# Patient Record
Sex: MALE | Race: WHITE | NOT HISPANIC OR LATINO | Employment: OTHER | URBAN - METROPOLITAN AREA
[De-identification: names, ages, dates, MRNs, and addresses within clinical notes are randomized per-mention and may not be internally consistent; named-entity substitution may affect disease eponyms.]

---

## 2019-02-18 ENCOUNTER — TELEPHONE (OUTPATIENT)
Dept: UROLOGY | Facility: MEDICAL CENTER | Age: 69
End: 2019-02-18

## 2019-02-18 NOTE — TELEPHONE ENCOUNTER
Reason for appointment/Complaint/Diagnosis : TARUN    Insurance: [address] [place] [address]  If yes, what kind? Not applicable    Previous urologist?     Over 20 years ago                Records requested/where? No    Outside testing/where? Not applicable     Location Preference for office visit?  [place] or first available

## 2020-09-29 ENCOUNTER — OFFICE VISIT (OUTPATIENT)
Dept: UROLOGY | Facility: HOSPITAL | Age: 70
End: 2020-09-29
Payer: COMMERCIAL

## 2020-09-29 VITALS
HEIGHT: 70 IN | SYSTOLIC BLOOD PRESSURE: 150 MMHG | HEART RATE: 79 BPM | BODY MASS INDEX: 30.64 KG/M2 | DIASTOLIC BLOOD PRESSURE: 82 MMHG | TEMPERATURE: 98.1 F | WEIGHT: 214 LBS

## 2020-09-29 DIAGNOSIS — N52.9 ERECTILE DYSFUNCTION, UNSPECIFIED ERECTILE DYSFUNCTION TYPE: ICD-10-CM

## 2020-09-29 DIAGNOSIS — R35.0 FREQUENCY OF URINATION: Primary | ICD-10-CM

## 2020-09-29 LAB
POST-VOID RESIDUAL VOLUME, ML POC: 0 ML
SL AMB  POCT GLUCOSE, UA: NORMAL
SL AMB LEUKOCYTE ESTERASE,UA: NORMAL
SL AMB POCT BILIRUBIN,UA: NORMAL
SL AMB POCT BLOOD,UA: NORMAL
SL AMB POCT CLARITY,UA: CLEAR
SL AMB POCT COLOR,UA: YELLOW
SL AMB POCT KETONES,UA: NORMAL
SL AMB POCT NITRITE,UA: NORMAL
SL AMB POCT PH,UA: 7
SL AMB POCT SPECIFIC GRAVITY,UA: 1
SL AMB POCT URINE PROTEIN: NORMAL
SL AMB POCT UROBILINOGEN: 0.2

## 2020-09-29 PROCEDURE — 81002 URINALYSIS NONAUTO W/O SCOPE: CPT | Performed by: UROLOGY

## 2020-09-29 PROCEDURE — 99244 OFF/OP CNSLTJ NEW/EST MOD 40: CPT | Performed by: UROLOGY

## 2020-09-29 PROCEDURE — 51798 US URINE CAPACITY MEASURE: CPT | Performed by: UROLOGY

## 2020-09-29 RX ORDER — TADALAFIL 5 MG/1
5 TABLET ORAL DAILY
Qty: 30 TABLET | Refills: 1 | Status: SHIPPED | OUTPATIENT
Start: 2020-09-29 | End: 2020-11-25 | Stop reason: SDUPTHER

## 2020-09-29 RX ORDER — CHLORAL HYDRATE 500 MG
1000 CAPSULE ORAL
COMMUNITY

## 2020-09-29 NOTE — PROGRESS NOTES
Assessment/Plan:    Frequency of urination  We discussed that his symptoms are likely secondary to an enlarging prostate  They are to get there records of labs and bring him to the next appointment  We discussed treatment options including medications and surgical procedures  We discussed daily Cialis as it would help with both his erectile dysfunction and his urinary symptoms  The patient wishes to try the medication  It was prescribed and side effects were discussed  He will follow up in 6-8 weeks to re-evaluate his symptoms and check a postvoid residual     Erectile dysfunction  Cialis prescribed as described above  Diagnoses and all orders for this visit:    Frequency of urination  -     POCT urine dip  -     POCT Measure PVR  -     tadalafil (CIALIS) 5 MG tablet; Take 1 tablet (5 mg total) by mouth daily    Erectile dysfunction, unspecified erectile dysfunction type  -     tadalafil (CIALIS) 5 MG tablet; Take 1 tablet (5 mg total) by mouth daily    Other orders  -     Omega-3 Fatty Acids (fish oil) 1,000 mg; Take 1,000 mg by mouth 4 (four) times a day (before meals and at bedtime)  -     CALCIUM-MAGNESIUM PO; Take by mouth  -     Probiotic Product (PROBIOTIC PO); Take by mouth          Total visit time was 60 minutes of which over 50% was spent on counseling  Subjective:     Patient ID: Tabby Mendez is a 72 y o  male    80-year-old male presents for evaluation of lower urinary tract symptoms  The patient states that since the new year he has been having worsening urinary symptoms  His symptoms are detailed below  His only medication is sildenafil as needed and testosterone replacement  He does get labs with the person that is managing his testosterone but he does not know what his PSA levels have been  He denies any family history of prostate cancer  He also has a distant history of a kidney stone which he passed on his own    He states that even with 100 mg of sildenafil he is having some issues with maintaining erections  He has no other complaints  The following portions of the patient's history were reviewed and updated as appropriate: allergies, current medications, past family history, past medical history, past social history, past surgical history and problem list     Review of Systems   Constitutional: Negative  HENT: Negative  Eyes: Negative  Respiratory: Negative  Cardiovascular: Negative  Gastrointestinal: Negative  Endocrine: Negative  Genitourinary:        As noted per HPI   Musculoskeletal: Negative  Skin: Negative  Allergic/Immunologic: Negative  Neurological: Negative  Hematological: Negative  Psychiatric/Behavioral: Negative  AUA SYMPTOM SCORE      Most Recent Value   AUA SYMPTOM SCORE   How often have you had a sensation of not emptying your bladder completely after you finished urinating? 5   How often have you had to urinate again less than two hours after you finished urinating? 5   How often have you found you stopped and started again several times when you urinate? 5   How often have you found it difficult to postpone urination? 3   How often have you had a weak urinary stream?  4   How often have you had to push or strain to begin urination? 4   How many times did you most typically get up to urinate from the time you went to bed at night until the time you got up in the morning? 2   Quality of Life: If you were to spend the rest of your life with your urinary condition just the way it is now, how would you feel about that?  6   AUA SYMPTOM SCORE  28              Objective:    Physical Exam  Vitals signs reviewed  Constitutional:       Appearance: He is well-developed  Neck:      Musculoskeletal: Normal range of motion  Pulmonary:      Effort: Pulmonary effort is normal    Abdominal:      General: Bowel sounds are normal  There is no distension  Palpations: Abdomen is soft  There is no mass  Tenderness: There is no abdominal tenderness  There is no guarding or rebound  Genitourinary:     Rectum: Normal       Comments: Unable to palpate the entire prostate but the apex was free of nodules  Musculoskeletal: Normal range of motion  Skin:     General: Skin is warm and dry  Neurological:      Mental Status: He is alert and oriented to person, place, and time             Results  No results found for: PSA  No results found for: GLUCOSE, CALCIUM, NA, K, CO2, CL, BUN, CREATININE  No results found for: WBC, HGB, HCT, MCV, PLT    Recent Results (from the past 1 hour(s))   POCT urine dip    Collection Time: 09/29/20 10:08 AM   Result Value Ref Range    LEUKOCYTE ESTERASE,UA -     NITRITE,UA -     SL AMB POCT UROBILINOGEN 0 2     POCT URINE PROTEIN -      PH,UA 7 0     BLOOD,UA -     SPECIFIC GRAVITY,UA 1 000     KETONES,UA -     BILIRUBIN,UA -     GLUCOSE, UA -      COLOR,UA yellow     CLARITY,UA clear    POCT Measure PVR    Collection Time: 09/29/20 10:08 AM   Result Value Ref Range    POST-VOID RESIDUAL VOLUME, ML POC 0 mL   ]

## 2020-09-29 NOTE — ASSESSMENT & PLAN NOTE
We discussed that his symptoms are likely secondary to an enlarging prostate  They are to get there records of labs and bring him to the next appointment  We discussed treatment options including medications and surgical procedures  We discussed daily Cialis as it would help with both his erectile dysfunction and his urinary symptoms  The patient wishes to try the medication  It was prescribed and side effects were discussed    He will follow up in 6-8 weeks to re-evaluate his symptoms and check a postvoid residual

## 2020-10-30 ENCOUNTER — TELEPHONE (OUTPATIENT)
Dept: UROLOGY | Facility: AMBULATORY SURGERY CENTER | Age: 70
End: 2020-10-30

## 2020-11-23 DIAGNOSIS — R35.0 FREQUENCY OF URINATION: ICD-10-CM

## 2020-11-23 DIAGNOSIS — N52.9 ERECTILE DYSFUNCTION, UNSPECIFIED ERECTILE DYSFUNCTION TYPE: ICD-10-CM

## 2020-11-25 ENCOUNTER — OFFICE VISIT (OUTPATIENT)
Dept: UROLOGY | Facility: HOSPITAL | Age: 70
End: 2020-11-25
Payer: COMMERCIAL

## 2020-11-25 VITALS
SYSTOLIC BLOOD PRESSURE: 160 MMHG | HEIGHT: 70 IN | DIASTOLIC BLOOD PRESSURE: 88 MMHG | BODY MASS INDEX: 30.64 KG/M2 | WEIGHT: 214 LBS | HEART RATE: 80 BPM

## 2020-11-25 DIAGNOSIS — R35.0 FREQUENCY OF URINATION: Primary | ICD-10-CM

## 2020-11-25 DIAGNOSIS — N52.9 ERECTILE DYSFUNCTION, UNSPECIFIED ERECTILE DYSFUNCTION TYPE: ICD-10-CM

## 2020-11-25 DIAGNOSIS — Z12.5 SCREENING FOR PROSTATE CANCER: ICD-10-CM

## 2020-11-25 LAB — POST-VOID RESIDUAL VOLUME, ML POC: 183 ML

## 2020-11-25 PROCEDURE — 99214 OFFICE O/P EST MOD 30 MIN: CPT | Performed by: NURSE PRACTITIONER

## 2020-11-25 PROCEDURE — 51798 US URINE CAPACITY MEASURE: CPT | Performed by: NURSE PRACTITIONER

## 2020-11-25 RX ORDER — TADALAFIL 5 MG/1
5 TABLET ORAL DAILY
Qty: 90 TABLET | Refills: 3 | Status: SHIPPED | OUTPATIENT
Start: 2020-11-25 | End: 2021-12-07 | Stop reason: SDUPTHER

## 2020-12-29 RX ORDER — TADALAFIL 5 MG/1
TABLET ORAL
Qty: 30 TABLET | Refills: 0 | OUTPATIENT
Start: 2020-12-29

## 2021-03-04 DIAGNOSIS — Z23 ENCOUNTER FOR IMMUNIZATION: ICD-10-CM

## 2021-03-07 ENCOUNTER — IMMUNIZATIONS (OUTPATIENT)
Dept: FAMILY MEDICINE CLINIC | Facility: HOSPITAL | Age: 71
End: 2021-03-07

## 2021-03-07 DIAGNOSIS — Z23 ENCOUNTER FOR IMMUNIZATION: Primary | ICD-10-CM

## 2021-03-07 PROCEDURE — 91300 SARS-COV-2 / COVID-19 MRNA VACCINE (PFIZER-BIONTECH) 30 MCG: CPT

## 2021-03-07 PROCEDURE — 0001A SARS-COV-2 / COVID-19 MRNA VACCINE (PFIZER-BIONTECH) 30 MCG: CPT

## 2021-05-14 ENCOUNTER — TELEPHONE (OUTPATIENT)
Dept: DERMATOLOGY | Facility: CLINIC | Age: 71
End: 2021-05-14

## 2021-10-09 ENCOUNTER — IMMUNIZATIONS (OUTPATIENT)
Dept: FAMILY MEDICINE CLINIC | Facility: HOSPITAL | Age: 71
End: 2021-10-09

## 2021-10-09 DIAGNOSIS — Z23 ENCOUNTER FOR IMMUNIZATION: Primary | ICD-10-CM

## 2021-10-09 PROCEDURE — 0002A SARS-COV-2 / COVID-19 MRNA VACCINE (PFIZER-BIONTECH) 30 MCG: CPT

## 2021-10-09 PROCEDURE — 91300 SARS-COV-2 / COVID-19 MRNA VACCINE (PFIZER-BIONTECH) 30 MCG: CPT

## 2021-12-07 ENCOUNTER — OFFICE VISIT (OUTPATIENT)
Dept: UROLOGY | Facility: HOSPITAL | Age: 71
End: 2021-12-07
Payer: COMMERCIAL

## 2021-12-07 VITALS
HEIGHT: 70 IN | BODY MASS INDEX: 31.21 KG/M2 | WEIGHT: 218 LBS | DIASTOLIC BLOOD PRESSURE: 90 MMHG | HEART RATE: 80 BPM | SYSTOLIC BLOOD PRESSURE: 158 MMHG

## 2021-12-07 DIAGNOSIS — N52.9 ERECTILE DYSFUNCTION, UNSPECIFIED ERECTILE DYSFUNCTION TYPE: ICD-10-CM

## 2021-12-07 DIAGNOSIS — R35.0 FREQUENCY OF URINATION: Primary | ICD-10-CM

## 2021-12-07 LAB — POST-VOID RESIDUAL VOLUME, ML POC: 11 ML

## 2021-12-07 PROCEDURE — 51798 US URINE CAPACITY MEASURE: CPT | Performed by: NURSE PRACTITIONER

## 2021-12-07 PROCEDURE — 99213 OFFICE O/P EST LOW 20 MIN: CPT | Performed by: NURSE PRACTITIONER

## 2021-12-07 RX ORDER — TAMSULOSIN HYDROCHLORIDE 0.4 MG/1
0.4 CAPSULE ORAL
Qty: 30 CAPSULE | Refills: 3 | Status: SHIPPED | OUTPATIENT
Start: 2021-12-07 | End: 2022-04-15 | Stop reason: SDUPTHER

## 2021-12-07 RX ORDER — TADALAFIL 5 MG/1
5 TABLET ORAL AS NEEDED
Qty: 30 TABLET | Refills: 1 | Status: SHIPPED | OUTPATIENT
Start: 2021-12-07 | End: 2022-07-27 | Stop reason: ALTCHOICE

## 2021-12-07 RX ORDER — IBUPROFEN 800 MG/1
TABLET ORAL
COMMUNITY
Start: 2021-11-12

## 2021-12-07 RX ORDER — COLCHICINE 0.6 MG/1
CAPSULE ORAL
COMMUNITY
Start: 2021-09-27

## 2022-03-04 ENCOUNTER — PROCEDURE VISIT (OUTPATIENT)
Dept: UROLOGY | Facility: HOSPITAL | Age: 72
End: 2022-03-04
Payer: COMMERCIAL

## 2022-03-04 VITALS
OXYGEN SATURATION: 96 % | DIASTOLIC BLOOD PRESSURE: 90 MMHG | BODY MASS INDEX: 30.64 KG/M2 | HEART RATE: 81 BPM | SYSTOLIC BLOOD PRESSURE: 158 MMHG | WEIGHT: 214 LBS | HEIGHT: 70 IN

## 2022-03-04 DIAGNOSIS — N40.1 BPH WITH URINARY OBSTRUCTION: ICD-10-CM

## 2022-03-04 DIAGNOSIS — R35.0 FREQUENCY OF URINATION: Primary | ICD-10-CM

## 2022-03-04 DIAGNOSIS — N13.8 BPH WITH URINARY OBSTRUCTION: ICD-10-CM

## 2022-03-04 PROCEDURE — 99213 OFFICE O/P EST LOW 20 MIN: CPT | Performed by: UROLOGY

## 2022-03-04 RX ORDER — MELOXICAM 15 MG/1
7.5-15 TABLET ORAL DAILY
COMMUNITY
Start: 2022-01-03

## 2022-03-04 NOTE — PATIENT INSTRUCTIONS
Over the next few weeks, make a record of the times that the leakage happens  What were you doing at the time? Did you have any sense of an urge to urinate before came out? How long had you last urinated?

## 2022-03-09 PROCEDURE — 52000 CYSTOURETHROSCOPY: CPT | Performed by: UROLOGY

## 2022-03-09 NOTE — PROGRESS NOTES
HISTORY:    Follow-up for evaluation of urgency frequency, symptoms of BPH  Also having some leakage  It is sometimes passive, with no warning whatsoever  Occasionally there is some urgency also  Prior note:   lower urinary tract symptoms including: urinary frequency, urgency as well as erectile dysfunction  He was previously known to Dr Kristy Loza  He has been maintained on 5 mg daily Cialis  Patient reports a mild improvement in his symptoms but continues to experience lower urinary tract symptoms and now reports inadequate erection         ASSESSMENT / PLAN:    Cysto shows large prostate, trabeculated bladder     His symptoms are a combination of BPH and overactive bladder    I am concerned about the degree and type of incontinence he has  I gave him written instructions to keep a record of when he has the incontinence over the next few weeks and we will discuss at the next visit  The following portions of the patient's history were reviewed and updated as appropriate: allergies, current medications, past family history, past medical history, past social history, past surgical history and problem list     Review of Systems   All other systems reviewed and are negative  Objective:     Cystoscopy     Date/Time 3/9/2022 3:31 PM     Performed by  Radha Brandt MD     Authorized by Radha Brandt MD          Procedure Details:  Procedure type: cystoscopy    Patient tolerance: Patient tolerated the procedure well with no immediate complications    Additional Procedure Details:      Patient presents for cystoscopy  I have discussed the reasons for doing the test, and the potential risks and complications  Patient expressed understanding, and signed informed consent document  The patient was carefully  positioned supine on the examining table  Sterile preparation was performed on the urethra  Xylocaine jelly was instilled and left  Indwelling for the procedure    [facility] with the following findings:      Urethra:  No strictures    Prostate:  lateral lobes significant enlargement, obstructing                   Bladder:  Mild trabeculation, no lesions, tumor, or stones  Residual urine:  100    Patient tolerated the procedure well and was escorted from the examining table  Physical Exam  Constitutional:       General: He is not in acute distress  Appearance: He is well-developed  He is not diaphoretic  HENT:      Head: Normocephalic and atraumatic  Eyes:      General: No scleral icterus  Pulmonary:      Effort: Pulmonary effort is normal    Skin:     Coloration: Skin is not pale  Neurological:      Mental Status: He is alert and oriented to person, place, and time  Psychiatric:         Behavior: Behavior normal          Thought Content: Thought content normal          Judgment: Judgment normal              No results found for: PSA]  No results found for: BUN  No results found for: CREATININE  No components found for: CBC      Patient Active Problem List   Diagnosis    Erectile dysfunction    Frequency of urination        There are no diagnoses linked to this encounter  Patient ID: Rob Ackerman is a 80 y o  male  Current Outpatient Medications:     CALCIUM-MAGNESIUM PO, Take by mouth, Disp: , Rfl:     Colchicine 0 6 MG CAPS, TAKE 2 TABLETS BY MOUTH AT EARLIEST ONSET OF ATTACK OF GOUT, Disp: , Rfl:     ibuprofen (MOTRIN) 800 mg tablet, , Disp: , Rfl:     meloxicam (MOBIC) 15 mg tablet, Take 7 5-15 mg by mouth daily, Disp: , Rfl:     Omega-3 Fatty Acids (fish oil) 1,000 mg, Take 1,000 mg by mouth 4 (four) times a day (before meals and at bedtime), Disp: , Rfl:     Probiotic Product (PROBIOTIC PO), Take by mouth, Disp: , Rfl:     tadalafil (CIALIS) 5 MG tablet, Take 1 tablet (5 mg total) by mouth as needed for erectile dysfunction 1 hour prior to sexual activity on an empty stomach  No alcohol   May take up to 20 mg total, Disp: 30 tablet, Rfl: 1    tamsulosin (FLOMAX) 0 4 mg, Take 1 capsule (0 4 mg total) by mouth daily with dinner Do not take in the same 24 hour period as Cialis, Disp: 30 capsule, Rfl: 3    History reviewed  No pertinent past medical history      Past Surgical History:   Procedure Laterality Date    CARPAL TUNNEL RELEASE      TONSILLECTOMY         Social History

## 2022-04-15 ENCOUNTER — OFFICE VISIT (OUTPATIENT)
Dept: UROLOGY | Facility: HOSPITAL | Age: 72
End: 2022-04-15
Payer: COMMERCIAL

## 2022-04-15 VITALS
WEIGHT: 219 LBS | HEIGHT: 70 IN | HEART RATE: 88 BPM | DIASTOLIC BLOOD PRESSURE: 90 MMHG | BODY MASS INDEX: 31.35 KG/M2 | SYSTOLIC BLOOD PRESSURE: 160 MMHG | OXYGEN SATURATION: 97 %

## 2022-04-15 DIAGNOSIS — N39.41 URGE INCONTINENCE: ICD-10-CM

## 2022-04-15 DIAGNOSIS — N40.1 BPH WITH URINARY OBSTRUCTION: Primary | ICD-10-CM

## 2022-04-15 DIAGNOSIS — R35.0 FREQUENCY OF URINATION: ICD-10-CM

## 2022-04-15 DIAGNOSIS — N13.8 BPH WITH URINARY OBSTRUCTION: Primary | ICD-10-CM

## 2022-04-15 LAB — POST-VOID RESIDUAL VOLUME, ML POC: 20 ML

## 2022-04-15 PROCEDURE — 99214 OFFICE O/P EST MOD 30 MIN: CPT | Performed by: UROLOGY

## 2022-04-15 PROCEDURE — 51798 US URINE CAPACITY MEASURE: CPT | Performed by: UROLOGY

## 2022-04-15 RX ORDER — TAMSULOSIN HYDROCHLORIDE 0.4 MG/1
0.8 CAPSULE ORAL
Qty: 60 CAPSULE | Refills: 5 | Status: SHIPPED | OUTPATIENT
Start: 2022-04-15

## 2022-04-15 RX ORDER — TESTOSTERONE 200 MG
PELLET (EA) IMPLANTATION
COMMUNITY
Start: 2022-03-07

## 2022-04-15 NOTE — PROGRESS NOTES
HISTORY:    Follow-up large BPH, obstructive symptoms, and occasional incontinence  Tamsulosin has helped to some degree  Stronger flow less urgency, nocturia x1  Still has some leakage without any warning  He will stand shaving, all the sudden find he is dripping with no warning whatsoever  No nocturnal leakage             ASSESSMENT / PLAN:    Increased tamsulosin, two per day    Follow-up three months    I am still concerned about the type of leakage he has, that can sometimes get worse after TURP    The following portions of the patient's history were reviewed and updated as appropriate: allergies, current medications, past family history, past medical history, past social history, past surgical history and problem list     Review of Systems   All other systems reviewed and are negative  Objective:     Physical Exam  Constitutional:       General: He is not in acute distress  Appearance: He is well-developed  He is not diaphoretic  HENT:      Head: Normocephalic and atraumatic  Eyes:      General: No scleral icterus  Pulmonary:      Effort: Pulmonary effort is normal    Skin:     Coloration: Skin is not pale  Neurological:      Mental Status: He is alert and oriented to person, place, and time  Psychiatric:         Behavior: Behavior normal          Thought Content: Thought content normal          Judgment: Judgment normal            No results found for: PSA]  No results found for: BUN  No results found for: CREATININE  No components found for: CBC      Patient Active Problem List   Diagnosis    Erectile dysfunction    Frequency of urination        Diagnoses and all orders for this visit:    Frequency of urination  -     tamsulosin (FLOMAX) 0 4 mg;  Take 2 capsules (0 8 mg total) by mouth daily with dinner Do not take in the same 24 hour period as Cialis  -     POCT Measure PVR    Other orders  -     Testosterone 200 MG PLLT           Patient ID: Mathieu Calderón is a 80 y o  male  Current Outpatient Medications:     CALCIUM-MAGNESIUM PO, Take by mouth, Disp: , Rfl:     Colchicine 0 6 MG CAPS, TAKE 2 TABLETS BY MOUTH AT EARLIEST ONSET OF ATTACK OF GOUT, Disp: , Rfl:     ibuprofen (MOTRIN) 800 mg tablet, , Disp: , Rfl:     meloxicam (MOBIC) 15 mg tablet, Take 7 5-15 mg by mouth daily, Disp: , Rfl:     Omega-3 Fatty Acids (fish oil) 1,000 mg, Take 1,000 mg by mouth 4 (four) times a day (before meals and at bedtime), Disp: , Rfl:     Probiotic Product (PROBIOTIC PO), Take by mouth, Disp: , Rfl:     tadalafil (CIALIS) 5 MG tablet, Take 1 tablet (5 mg total) by mouth as needed for erectile dysfunction 1 hour prior to sexual activity on an empty stomach  No alcohol  May take up to 20 mg total, Disp: 30 tablet, Rfl: 1    tamsulosin (FLOMAX) 0 4 mg, Take 1 capsule (0 4 mg total) by mouth daily with dinner Do not take in the same 24 hour period as Cialis, Disp: 30 capsule, Rfl: 3    Testosterone 200 MG PLLT, , Disp: , Rfl:     History reviewed  No pertinent past medical history      Past Surgical History:   Procedure Laterality Date    CARPAL TUNNEL RELEASE      TONSILLECTOMY         Social History

## 2022-06-29 ENCOUNTER — OFFICE VISIT (OUTPATIENT)
Dept: PODIATRY | Facility: CLINIC | Age: 72
End: 2022-06-29
Payer: COMMERCIAL

## 2022-06-29 VITALS
HEIGHT: 70 IN | BODY MASS INDEX: 30.06 KG/M2 | WEIGHT: 210 LBS | SYSTOLIC BLOOD PRESSURE: 166 MMHG | DIASTOLIC BLOOD PRESSURE: 85 MMHG | HEART RATE: 86 BPM

## 2022-06-29 DIAGNOSIS — L85.2 PUNCTATE KERATOSIS: Primary | ICD-10-CM

## 2022-06-29 DIAGNOSIS — B35.3 TINEA PEDIS OF BOTH FEET: ICD-10-CM

## 2022-06-29 PROCEDURE — 99202 OFFICE O/P NEW SF 15 MIN: CPT | Performed by: PODIATRIST

## 2022-06-29 RX ORDER — CLOTRIMAZOLE AND BETAMETHASONE DIPROPIONATE 10; .64 MG/G; MG/G
CREAM TOPICAL 2 TIMES DAILY
Qty: 45 G | Refills: 4 | Status: SHIPPED | OUTPATIENT
Start: 2022-06-29

## 2022-06-29 NOTE — PROGRESS NOTES
PATIENT:  Daria Faulkner  1950    ASSESSMENT/PLAN:  1  Punctate keratosis     2  Tinea pedis of both feet  clotrimazole-betamethasone (LOTRISONE) 1-0 05 % cream            1  Patient was counseled and educated on the condition and the diagnosis  2  The diagnosis, treatment options and prognosis were discussed with the patient  3  Lesions were excised  Possible chemical treatment  4  Rx: Lotrisone cream for chronic tinea pedis  5  Patient will return in 6 weeks for re-evaluation  HPI:  Daria Faulkner is a 71 y  o year old male seen with chief complaint of bilateral foot pain  He has sharp pain in both feet for several months  Pain presents with walking  He also has skin peeling and redness in plantar feet for a while  The patient denied any acute pedal disorder, redness, acute swelling, or recent injury  PAST MEDICAL HISTORY:  History reviewed  No pertinent past medical history  PAST SURGICAL HISTORY:  Past Surgical History:   Procedure Laterality Date    CARPAL TUNNEL RELEASE      TONSILLECTOMY          ALLERGIES:  Patient has no known allergies  MEDICATIONS:  Current Outpatient Medications   Medication Sig Dispense Refill    CALCIUM-MAGNESIUM PO Take by mouth      clotrimazole-betamethasone (LOTRISONE) 1-0 05 % cream Apply topically 2 (two) times a day 45 g 4    Colchicine 0 6 MG CAPS TAKE 2 TABLETS BY MOUTH AT EARLIEST ONSET OF ATTACK OF GOUT      ibuprofen (MOTRIN) 800 mg tablet       meloxicam (MOBIC) 15 mg tablet Take 7 5-15 mg by mouth daily      Omega-3 Fatty Acids (fish oil) 1,000 mg Take 1,000 mg by mouth 4 (four) times a day (before meals and at bedtime)      Probiotic Product (PROBIOTIC PO) Take by mouth      tadalafil (CIALIS) 5 MG tablet Take 1 tablet (5 mg total) by mouth as needed for erectile dysfunction 1 hour prior to sexual activity on an empty stomach  No alcohol   May take up to 20 mg total 30 tablet 1    tamsulosin (FLOMAX) 0 4 mg Take 2 capsules (0 8 mg total) by mouth daily with dinner Do not take in the same 24 hour period as Cialis 60 capsule 5    Testosterone 200 MG PLLT        No current facility-administered medications for this visit  SOCIAL HISTORY:  Social History     Socioeconomic History    Marital status: /Civil Union     Spouse name: None    Number of children: None    Years of education: None    Highest education level: None   Occupational History    None   Tobacco Use    Smoking status: Never Smoker    Smokeless tobacco: Never Used   Vaping Use    Vaping Use: Never used   Substance and Sexual Activity    Alcohol use: Not Currently    Drug use: Never    Sexual activity: None   Other Topics Concern    None   Social History Narrative    None     Social Determinants of Health     Financial Resource Strain: Not on file   Food Insecurity: Not on file   Transportation Needs: Not on file   Physical Activity: Not on file   Stress: Not on file   Social Connections: Not on file   Intimate Partner Violence: Not on file   Housing Stability: Not on file        REVIEW OF SYSTEMS:  GENERAL: No fever or chills  HEART: No chest pain, or palpitation  RESPIRATORY:  No acute SOB or cough  GI: No Nausea, vomit or diarrhea  NEUROLOGIC: No syncope or acute weakness    PHYSICAL EXAM:    /85 (BP Location: Left arm, Patient Position: Sitting, Cuff Size: Adult)   Pulse 86   Ht 5' 10" (1 778 m)   Wt 95 3 kg (210 lb)   BMI 30 13 kg/m²     VASCULAR EXAM  Dorsalis pedis  +2  Posterior tibial artery  +2  No ischemia  No cyanosis  CRT WNL  NEUROLOGIC EXAM  AAO X 3   CN intact  MMT intact  Sensation is intact to light touch  No focal neurologic deficit  DERMATOLOGIC EXAM:   No ulcer  No cellulitis  Mycotic nails presents  Chronic tinea pedis with skin eruption and redness in plantar feet  Punctate keratosis with surrounding callus in left submet 5 area and right 5th met head laterally      MUSCULOSKELETAL EXAM:   No acute joint pain  No acute edema or redness  No acute musculoskeletal problem  No pain in joints

## 2022-07-27 ENCOUNTER — OFFICE VISIT (OUTPATIENT)
Dept: UROLOGY | Facility: HOSPITAL | Age: 72
End: 2022-07-27
Payer: COMMERCIAL

## 2022-07-27 VITALS
HEART RATE: 86 BPM | HEIGHT: 70 IN | OXYGEN SATURATION: 94 % | DIASTOLIC BLOOD PRESSURE: 84 MMHG | SYSTOLIC BLOOD PRESSURE: 138 MMHG | BODY MASS INDEX: 29.78 KG/M2 | WEIGHT: 208 LBS

## 2022-07-27 DIAGNOSIS — R35.0 FREQUENCY OF URINATION: Primary | ICD-10-CM

## 2022-07-27 DIAGNOSIS — N52.9 ERECTILE DYSFUNCTION, UNSPECIFIED ERECTILE DYSFUNCTION TYPE: ICD-10-CM

## 2022-07-27 DIAGNOSIS — N40.1 BENIGN PROSTATIC HYPERPLASIA WITH LOWER URINARY TRACT SYMPTOMS, SYMPTOM DETAILS UNSPECIFIED: ICD-10-CM

## 2022-07-27 LAB — POST-VOID RESIDUAL VOLUME, ML POC: 7 ML

## 2022-07-27 PROCEDURE — 51798 US URINE CAPACITY MEASURE: CPT | Performed by: NURSE PRACTITIONER

## 2022-07-27 PROCEDURE — 99213 OFFICE O/P EST LOW 20 MIN: CPT | Performed by: NURSE PRACTITIONER

## 2022-07-27 RX ORDER — SILDENAFIL 50 MG/1
50 TABLET, FILM COATED ORAL AS NEEDED
Qty: 10 TABLET | Refills: 3 | Status: SHIPPED | OUTPATIENT
Start: 2022-07-27 | End: 2022-08-04 | Stop reason: SDUPTHER

## 2022-07-27 NOTE — PROGRESS NOTES
7/27/2022    Rebecca Kimbrough  1950  [id number]      Assessment  -BPH with lower urinary tract symptoms  -Erectile dysfunction    Discussion/Plan  Myla Aguero is a 71 y o  male being managed by Dr Marleen Rivas  1  BPH with lower urinary tract symptoms- PVR in the office today is 7 milliliters  He does note an improvement in his urinary symptoms  Patient will continue to take tamsulosin 0 4 milligram b i d   Reviewed dietary and behavioral modifications as well as timed and double voiding  2  Erectile dysfunction- patient is requesting a prescription refill for sildenafil  He feels this was more effective than Cialis  Reviewed mechanism action, potential side effects, and administration instructions  Plan to follow-up in 1 year for re-evaluation and PVR assessment  However, he was advised to call sooner with any questions or issues     -All questions answered, patient and wife agrees with plan      History of Present Illness  71 y o  male with a history of BPH and ED presents today for follow up  Patient last seen in the office in April 2022  He is accompanied today by his wife  His tamsulosin dosage was increased to b i d  for management of urinary urgency, nocturia, and occasional urge incontinence  Patient underwent cystoscopy in March 2022 which revealed a large prostate with trabeculated bladder  He has noticed mild improvement in postvoid dribbling and urinary frequency  Patient feels he is emptying his bladder to completion  He denies any gross hematuria or dysuria  Patient has also been using Cialis as needed prior to sexual activity, but states this is not been effective with maintaining an erection  He had previously taken sildenafil which he feels was more effective  He is requesting a prescription refill  Review of Systems  Review of Systems   Constitutional: Negative  HENT: Negative  Respiratory: Negative  Cardiovascular: Negative  Gastrointestinal: Negative  Genitourinary: Negative for decreased urine volume, difficulty urinating, dysuria, flank pain, frequency, hematuria and urgency  Musculoskeletal: Negative  Skin: Negative  Neurological: Negative  Psychiatric/Behavioral: Negative  Past Medical History  No past medical history on file      Past Social History  Past Surgical History:   Procedure Laterality Date    CARPAL TUNNEL RELEASE      TONSILLECTOMY         Past Family History  Family History   Problem Relation Age of Onset    Hypertension Mother     Kidney disease Mother     Gout Paternal Grandfather        Past Social history  Social History     Socioeconomic History    Marital status: /Civil Union     Spouse name: Not on file    Number of children: Not on file    Years of education: Not on file    Highest education level: Not on file   Occupational History    Not on file   Tobacco Use    Smoking status: Never Smoker    Smokeless tobacco: Never Used   Vaping Use    Vaping Use: Never used   Substance and Sexual Activity    Alcohol use: Not Currently    Drug use: Never    Sexual activity: Not on file   Other Topics Concern    Not on file   Social History Narrative    Not on file     Social Determinants of Health     Financial Resource Strain: Not on file   Food Insecurity: Not on file   Transportation Needs: Not on file   Physical Activity: Not on file   Stress: Not on file   Social Connections: Not on file   Intimate Partner Violence: Not on file   Housing Stability: Not on file       Current Medications  Current Outpatient Medications   Medication Sig Dispense Refill    CALCIUM-MAGNESIUM PO Take by mouth      clotrimazole-betamethasone (LOTRISONE) 1-0 05 % cream Apply topically 2 (two) times a day 45 g 4    Colchicine 0 6 MG CAPS TAKE 2 TABLETS BY MOUTH AT EARLIEST ONSET OF ATTACK OF GOUT      ibuprofen (MOTRIN) 800 mg tablet       meloxicam (MOBIC) 15 mg tablet Take 7 5-15 mg by mouth daily      Omega-3 Fatty Acids (fish oil) 1,000 mg Take 1,000 mg by mouth 4 (four) times a day (before meals and at bedtime)      Probiotic Product (PROBIOTIC PO) Take by mouth      tadalafil (CIALIS) 5 MG tablet Take 1 tablet (5 mg total) by mouth as needed for erectile dysfunction 1 hour prior to sexual activity on an empty stomach  No alcohol  May take up to 20 mg total 30 tablet 1    tamsulosin (FLOMAX) 0 4 mg Take 2 capsules (0 8 mg total) by mouth daily with dinner Do not take in the same 24 hour period as Cialis 60 capsule 5    Testosterone 200 MG PLLT        No current facility-administered medications for this visit  Allergies  No Known Allergies    Past Medical History, Social History, Family History, medications and allergies were reviewed  Vitals  There were no vitals filed for this visit  Physical Exam  Physical Exam  Constitutional:       Appearance: Normal appearance  He is well-developed  HENT:      Head: Normocephalic  Eyes:      Pupils: Pupils are equal, round, and reactive to light  Pulmonary:      Effort: Pulmonary effort is normal    Abdominal:      Palpations: Abdomen is soft  Musculoskeletal:         General: Normal range of motion  Cervical back: Normal range of motion  Skin:     General: Skin is warm and dry  Neurological:      General: No focal deficit present  Mental Status: He is alert and oriented to person, place, and time  Psychiatric:         Mood and Affect: Mood normal          Behavior: Behavior normal          Thought Content: Thought content normal          Judgment: Judgment normal          Results    I have personally reviewed all pertinent lab results and reviewed with patient  No results found for: PSA  No results found for: GLUCOSE, CALCIUM, NA, K, CO2, CL, BUN, CREATININE  No results found for: WBC, HGB, HCT, MCV, PLT  No results found for this or any previous visit (from the past 1 hour(s))

## 2022-08-03 ENCOUNTER — TELEPHONE (OUTPATIENT)
Dept: UROLOGY | Facility: AMBULATORY SURGERY CENTER | Age: 72
End: 2022-08-03

## 2022-08-03 NOTE — TELEPHONE ENCOUNTER
Patient under the care of Kylee Landrum in [facility]  Patient's wife calling to request the prescription for Sildenafil be sent to [facility] in [facility] instead  She stated it was originally sent to [facility] but they wanted over $200 for 10 pills  She is requesting a call back at [phone] when the prescription is sent

## 2022-10-20 DIAGNOSIS — R35.0 FREQUENCY OF URINATION: ICD-10-CM

## 2022-10-20 RX ORDER — TAMSULOSIN HYDROCHLORIDE 0.4 MG/1
CAPSULE ORAL
Qty: 180 CAPSULE | Refills: 1 | Status: SHIPPED | OUTPATIENT
Start: 2022-10-20

## 2023-05-07 DIAGNOSIS — R35.0 FREQUENCY OF URINATION: ICD-10-CM

## 2023-05-15 RX ORDER — TAMSULOSIN HYDROCHLORIDE 0.4 MG/1
CAPSULE ORAL
Qty: 180 CAPSULE | Refills: 1 | Status: SHIPPED | OUTPATIENT
Start: 2023-05-15

## 2023-11-22 DIAGNOSIS — R35.0 FREQUENCY OF URINATION: ICD-10-CM

## 2023-11-22 RX ORDER — TAMSULOSIN HYDROCHLORIDE 0.4 MG/1
CAPSULE ORAL
Qty: 180 CAPSULE | Refills: 0 | Status: SHIPPED | OUTPATIENT
Start: 2023-11-22

## 2023-11-22 NOTE — TELEPHONE ENCOUNTER
Date: 12/11/2023 Status: [facility]   Time: 12:40 PM Length: 20   Visit Type: FOLLOW UP PG [[id number]] Copay: $40.00   Provider: Trey Pickard PA-C Department: [facility] Area:  Department Phone: [phone]   Referral Number:   Referral Status:     Notes: yrly f/u-Patient needs an appointment. Please contact the patient to schedule an appointment. Courtesy refill provided. Please note the above appointment, I added on the information about the medication, please advise thank-you.

## 2024-02-06 NOTE — PROGRESS NOTES
2/7/2024      Chief Complaint   Patient presents with    Benign Prostatic Hypertrophy         Assessment and Plan    73 y.o. male     1. BPH with LUTS  - UA today negative for infection or blood  - PVR today 81 mL. Discussed timed voiding, double voiding, sitting to urinate  - Continue flomax 2 times daily  - Trial of finasteride  - Continue conservative measures  - Follow up in 4 months for symptom reassessment. Consider repeat workup with cystoscopy TRUS    2. Erectile dysfunction  - Continue sildenafil as needed  - Call with any questions or concerns in the meantime  - All questions answered; patient understands and agrees with plan       History of Present Illness  Jonathan Kimbrough is a 73 y.o. male patient with history of BPH with LUTS and ED here for follow up.     Patient using flomax two times daily with benefit of BPH with LUTS. Denies side effects. Continues with bothersome symptoms. Patient underwent cystoscopy in March 2022 which revealed a large prostate with trabeculated bladder.     Patient using sildenafil as needed for ED with benefit and without side effects.     Denies new or worsening symptoms today.     Review of Systems   Constitutional:  Negative for activity change, appetite change, chills and fever.   HENT:  Negative for congestion and trouble swallowing.    Respiratory:  Negative for cough and shortness of breath.    Cardiovascular:  Negative for chest pain, palpitations and leg swelling.   Gastrointestinal:  Negative for abdominal pain, constipation, diarrhea, nausea and vomiting.   Genitourinary:  Positive for frequency and urgency. Negative for difficulty urinating, dysuria, flank pain and hematuria.   Musculoskeletal:  Negative for back pain and gait problem.   Skin:  Negative for wound.   Allergic/Immunologic: Negative for immunocompromised state.   Neurological:  Negative for dizziness and syncope.   Hematological:  Does not bruise/bleed easily.   Psychiatric/Behavioral:  Negative  "for confusion.    All other systems reviewed and are negative.          AUA SYMPTOM SCORE      Flowsheet Row Most Recent Value   AUA SYMPTOM SCORE    How often have you had a sensation of not emptying your bladder completely after you finished urinating? 3 (P)    How often have you had to urinate again less than two hours after you finished urinating? 4 (P)    How often have you found you stopped and started again several times when you urinate? 3 (P)    How often have you found it difficult to postpone urination? 4 (P)    How often have you had a weak urinary stream? 3 (P)    How often have you had to push or strain to begin urination? 2 (P)    How many times did you most typically get up to urinate from the time you went to bed at night until the time you got up in the morning? 5 (P)    Quality of Life: If you were to spend the rest of your life with your urinary condition just the way it is now, how would you feel about that? 5 (P)    AUA SYMPTOM SCORE 24 (P)              Vitals  Vitals:    02/07/24 0919   BP: 148/88   BP Location: Left arm   Patient Position: Sitting   Cuff Size: Standard   Pulse: 83   Resp: 14   SpO2: 96%   Weight: 98.1 kg (216 lb 3.2 oz)   Height: 5' 10\" (1.778 m)       Physical Exam  Constitutional:       General: He is not in acute distress.     Appearance: Normal appearance. He is not ill-appearing, toxic-appearing or diaphoretic.   HENT:      Head: Normocephalic.      Nose: No congestion.   Eyes:      General: No scleral icterus.        Right eye: No discharge.         Left eye: No discharge.      Conjunctiva/sclera: Conjunctivae normal.      Pupils: Pupils are equal, round, and reactive to light.   Pulmonary:      Effort: Pulmonary effort is normal.   Musculoskeletal:      Cervical back: Normal range of motion.   Skin:     General: Skin is warm and dry.      Coloration: Skin is not jaundiced or pale.      Findings: No bruising, erythema, lesion or rash.   Neurological:      General: No " focal deficit present.      Mental Status: He is alert and oriented to person, place, and time. Mental status is at baseline.      Gait: Gait normal.   Psychiatric:         Mood and Affect: Mood normal.         Behavior: Behavior normal.         Thought Content: Thought content normal.         Judgment: Judgment normal.           Past History  History reviewed. No pertinent past medical history.  Social History     Socioeconomic History    Marital status: /Civil Union     Spouse name: None    Number of children: None    Years of education: None    Highest education level: None   Occupational History    None   Tobacco Use    Smoking status: Never    Smokeless tobacco: Never   Vaping Use    Vaping status: Never Used   Substance and Sexual Activity    Alcohol use: Not Currently    Drug use: Never    Sexual activity: None   Other Topics Concern    None   Social History Narrative    None     Social Determinants of Health     Financial Resource Strain: Not on file   Food Insecurity: Not on file   Transportation Needs: Not on file   Physical Activity: Not on file   Stress: Not on file   Social Connections: Not on file   Intimate Partner Violence: Not on file   Housing Stability: Not on file     Social History     Tobacco Use   Smoking Status Never   Smokeless Tobacco Never     Family History   Problem Relation Age of Onset    Hypertension Mother     Kidney disease Mother     Gout Paternal Grandfather        The following portions of the patient's history were reviewed and updated as appropriate: allergies, current medications, past medical history, past social history, past surgical history and problem list.    Results  Recent Results (from the past 1 hour(s))   POCT Measure PVR    Collection Time: 02/07/24  9:22 AM   Result Value Ref Range    POST-VOID RESIDUAL VOLUME, ML POC 81 mL   POCT urine dip    Collection Time: 02/07/24  9:23 AM   Result Value Ref Range    LEUKOCYTE ESTERASE,UA -     NITRITE,UA -     SL AMB  "POCT UROBILINOGEN -     POCT URINE PROTEIN -      PH,UA 7.5     BLOOD,UA -     SPECIFIC GRAVITY,UA 1.010     KETONES,UA -     BILIRUBIN,UA -     GLUCOSE, UA -      COLOR,UA yellow     CLARITY,UA clear    ]  No results found for: \"PSA\"  No results found for: \"GLUCOSE\", \"CALCIUM\", \"NA\", \"K\", \"CO2\", \"CL\", \"BUN\", \"CREATININE\"  No results found for: \"WBC\", \"HGB\", \"HCT\", \"MCV\", \"PLT\"    Catalina Levine PA-C  "

## 2024-02-07 ENCOUNTER — OFFICE VISIT (OUTPATIENT)
Dept: UROLOGY | Facility: CLINIC | Age: 74
End: 2024-02-07
Payer: COMMERCIAL

## 2024-02-07 VITALS
HEIGHT: 70 IN | SYSTOLIC BLOOD PRESSURE: 148 MMHG | WEIGHT: 216.2 LBS | OXYGEN SATURATION: 96 % | HEART RATE: 83 BPM | DIASTOLIC BLOOD PRESSURE: 88 MMHG | RESPIRATION RATE: 14 BRPM | BODY MASS INDEX: 30.95 KG/M2

## 2024-02-07 DIAGNOSIS — N52.9 ERECTILE DYSFUNCTION, UNSPECIFIED ERECTILE DYSFUNCTION TYPE: ICD-10-CM

## 2024-02-07 DIAGNOSIS — R35.0 FREQUENCY OF URINATION: Primary | ICD-10-CM

## 2024-02-07 LAB
POST-VOID RESIDUAL VOLUME, ML POC: 81 ML
SL AMB  POCT GLUCOSE, UA: NORMAL
SL AMB LEUKOCYTE ESTERASE,UA: NORMAL
SL AMB POCT BILIRUBIN,UA: NORMAL
SL AMB POCT BLOOD,UA: NORMAL
SL AMB POCT CLARITY,UA: CLEAR
SL AMB POCT COLOR,UA: YELLOW
SL AMB POCT KETONES,UA: NORMAL
SL AMB POCT NITRITE,UA: NORMAL
SL AMB POCT PH,UA: 7.5
SL AMB POCT SPECIFIC GRAVITY,UA: 1.01
SL AMB POCT URINE PROTEIN: NORMAL
SL AMB POCT UROBILINOGEN: NORMAL

## 2024-02-07 PROCEDURE — 81002 URINALYSIS NONAUTO W/O SCOPE: CPT | Performed by: PHYSICIAN ASSISTANT

## 2024-02-07 PROCEDURE — 51798 US URINE CAPACITY MEASURE: CPT | Performed by: PHYSICIAN ASSISTANT

## 2024-02-07 PROCEDURE — 99213 OFFICE O/P EST LOW 20 MIN: CPT | Performed by: PHYSICIAN ASSISTANT

## 2024-02-07 RX ORDER — TAMSULOSIN HYDROCHLORIDE 0.4 MG/1
0.8 CAPSULE ORAL
Qty: 180 CAPSULE | Refills: 3 | Status: SHIPPED | OUTPATIENT
Start: 2024-02-07

## 2024-02-07 RX ORDER — SILDENAFIL 50 MG/1
50 TABLET, FILM COATED ORAL AS NEEDED
Qty: 10 TABLET | Refills: 6 | Status: SHIPPED | OUTPATIENT
Start: 2024-02-07

## 2024-02-07 RX ORDER — FINASTERIDE 5 MG/1
5 TABLET, FILM COATED ORAL DAILY
Qty: 90 TABLET | Refills: 3 | Status: SHIPPED | OUTPATIENT
Start: 2024-02-07

## 2024-05-10 DIAGNOSIS — R35.0 FREQUENCY OF URINATION: ICD-10-CM

## 2024-05-10 RX ORDER — FINASTERIDE 5 MG/1
5 TABLET, FILM COATED ORAL DAILY
Qty: 90 TABLET | Refills: 1 | Status: SHIPPED | OUTPATIENT
Start: 2024-05-10

## 2024-08-04 NOTE — PROGRESS NOTES
Ambulatory Visit  Name: Jonathan Kimbrough      : 1950      MRN: [id number]  Encounter Provider: Natalya Aguila PA-C  Encounter Date: 2024   Encounter department: [facility]    Assessment & Plan   1. Other male erectile dysfunction  Assessment & Plan:  Previously taking sildenafil 100 mg PRN  Not content with results  Trial cialis 20 mg PRN to see if it offers better results  Side effects discussed   Trimix would be next step - discussed with patient, not interested at this time   Continue to monitor   Orders:  -     POCT urine dip  -     POCT Measure PVR  -     tadalafil (CIALIS) 20 MG tablet; Take 1 tablet (20 mg total) by mouth daily as needed for erectile dysfunction  2. Frequency of urination  Assessment & Plan:  Cystoscopy 3/9/22 -- enlarged lateral lobes causing obstruction  AUA score 22 today in office   PVR 0 mL  Urine dip shows no sign of infection or blood today in office    Discontinue Flomax 0.4 mg BID as prescribed - symptoms have not changed while on medication   Continue finasteride 5 mg daily as prescribed   Refill as needed   Trial oxybutynin 10 mg daily for urinary frequency   Reach out to office for refills or to try a different medication   Encouraged increased hydration and avoidance of bladder irritants / constipation   If symptoms persist consider repeat cysto/TURS with MD and surgical options discussion  Orders:  -     POCT urine dip  -     POCT Measure PVR  -     oxybutynin (DITROPAN-XL) 10 MG 24 hr tablet; Take 1 tablet (10 mg total) by mouth daily at bedtime  -     Ambulatory Referral to Physical Therapy; Future  3. Screening for prostate cancer  Assessment & Plan:  No prior PSA on file - order placed  TARUN deferred - recommend at next appointment   Denies family history of prostate cancer   Orders:  -     PSA, Total Screen; Future; Expected date: 2025  4. Mixed incontinence  Assessment & Plan:  Refer to assessment/plan for frequent  urination   Referral to PFPT placed       History of Present Illness     Jonathan Kimbrough is a 74 y.o. male who presents for 4-month follow-up to rediscuss urinary frequency, erectile dysfunction, and prostate cancer screening.  Patient underwent cystoscopy in March 2022 which revealed enlarged lateral lobes causing obstruction.  Patient was initiated on Flomax 0.4 mg twice daily as well as finasteride 5 mg daily.  Patient states he has not noticed any changes in his urinary pattern since initiating the medications.  AUA score is 22 today in office.  PVR is 0 mL, no signs of retention.  Ongoing urinary frequency/urgency throughout the day as well as weakened stream.  Urine dip shows no signs of infection or blood.  Patient currently denies dysuria, hematuria, incomplete emptying, or pain in the bladder/bilateral flanks.  I recommend continuing finasteride at this time as the medication takes multiple months to achieve its full effect and directly works on shrinking the prostate.  Discontinue Flomax 0.4 mg to begin taking oxybutynin 10 mg daily for urinary frequency/OAB.  Patient educated to reach out to the office for refills in 1 month or to trial a different medication.  Patient is aware to maintain adequate hydration and avoid bladder irritants/constipation.  Patient states he has also been dealing with urge/passive incontinence that has gotten worse over the past few months.  Referral to pelvic floor physical therapy was placed.  If symptoms persist I would consider repeat cystoscopy/TRUS with MD to discuss surgical options.    When it comes to the patient's erectile dysfunction he was previously on sildenafil 100 mg as needed.  He reported dizziness and headaches intermittently and was not content with the results.  Reported his erection as 25% rigid, unable to penetrate, and it would not stay rigid for long.  Trial Cialis 20 mg as needed to see if this offers better results.  Trimix was discussed with the  patient, he is currently not interested.  Patient is aware that the neck step would be penile injections.  Continue to monitor symptoms.    The patient does not have any PSAs on file, he is not sure if he has ever had 1 done before.  PSA ordered at this time.  Patient denies family history of prostate cancer.  TARUN deferred today in office, recommended at next office visit.  Return in 6 months to monitor progression of urinary symptoms as well as surveillance of annual PSA/TARUN.    Review of Systems   Constitutional:  Negative for activity change, chills, fatigue and fever.   Respiratory:  Negative for apnea, cough and shortness of breath.    Cardiovascular:  Negative for chest pain and leg swelling.   Gastrointestinal:  Negative for abdominal distention, abdominal pain, constipation, diarrhea, nausea and vomiting.   Genitourinary:  Positive for frequency and urgency. Negative for decreased urine volume, difficulty urinating, dysuria, flank pain, hematuria, penile pain and testicular pain.        Weak stream  Mix of urge and passive incontinence    Neurological:  Negative for dizziness and headaches.   Psychiatric/Behavioral: Negative.       Medical History Reviewed by provider this encounter:  Tobacco  Allergies  Meds  Problems  Med Hx  Surg Hx  Fam Hx       Current Outpatient Medications on File Prior to Visit   Medication Sig Dispense Refill    CALCIUM-MAGNESIUM PO Take by mouth      Colchicine 0.6 MG CAPS TAKE 2 TABLETS BY MOUTH AT EARLIEST ONSET OF ATTACK OF GOUT      finasteride (PROSCAR) 5 mg tablet Take 1 tablet (5 mg total) by mouth daily 90 tablet 1    ibuprofen (MOTRIN) 800 mg tablet       meloxicam (MOBIC) 15 mg tablet Take 7.5-15 mg by mouth daily      Omega-3 Fatty Acids (fish oil) 1,000 mg Take 1,000 mg by mouth 4 (four) times a day (before meals and at bedtime)      Probiotic Product (PROBIOTIC PO) Take by mouth      tamsulosin (FLOMAX) 0.4 mg Take 2 capsules (0.8 mg total) by mouth daily with  "dinner 180 capsule 3    [DISCONTINUED] clotrimazole-betamethasone (LOTRISONE) 1-0.05 % cream Apply topically 2 (two) times a day (Patient not taking: Reported on 2/7/2024) 45 g 4    [DISCONTINUED] sildenafil (VIAGRA) 50 MG tablet Take 1 tablet (50 mg total) by mouth as needed for erectile dysfunction 10 tablet 6    [DISCONTINUED] Testosterone 200 MG PLLT  (Patient not taking: Reported on 2/7/2024)       No current facility-administered medications on file prior to visit.      Social History     Tobacco Use    Smoking status: Never    Smokeless tobacco: Never   Vaping Use    Vaping status: Never Used   Substance and Sexual Activity    Alcohol use: Not Currently    Drug use: Never    Sexual activity: Not on file     AUA SYMPTOM SCORE      Flowsheet Row Most Recent Value   AUA SYMPTOM SCORE    How often have you had a sensation of not emptying your bladder completely after you finished urinating? 4 (P)     How often have you had to urinate again less than two hours after you finished urinating? 5 (P)     How often have you found you stopped and started again several times when you urinate? 4 (P)     How often have you found it difficult to postpone urination? 2 (P)     How often have you had a weak urinary stream? 1 (P)     How often have you had to push or strain to begin urination? 1 (P)     How many times did you most typically get up to urinate from the time you went to bed at night until the time you got up in the morning? 5 (P)     Quality of Life: If you were to spend the rest of your life with your urinary condition just the way it is now, how would you feel about that? 6 (P)     AUA SYMPTOM SCORE 22 (P)            Objective     /84 (BP Location: Left arm, Patient Position: Sitting, Cuff Size: Adult)   Pulse 76   Ht 5' 10\" (1.778 m)   Wt 98.3 kg (216 lb 12.8 oz)   SpO2 97%   BMI 31.11 kg/m²   Physical Exam  Vitals and nursing note reviewed.   Constitutional:       General: He is not in acute " "distress.     Appearance: Normal appearance. He is well-developed. He is obese.   HENT:      Head: Normocephalic and atraumatic.   Eyes:      Conjunctiva/sclera: Conjunctivae normal.   Cardiovascular:      Rate and Rhythm: Normal rate and regular rhythm.      Heart sounds: No murmur heard.  Pulmonary:      Effort: Pulmonary effort is normal. No respiratory distress.      Breath sounds: Normal breath sounds.   Abdominal:      General: Abdomen is flat. There is no distension.      Palpations: Abdomen is soft.      Tenderness: There is no abdominal tenderness.   Genitourinary:     Comments: TARUN deferred   Musculoskeletal:         General: No swelling.      Cervical back: Neck supple.   Skin:     General: Skin is warm and dry.      Capillary Refill: Capillary refill takes less than 2 seconds.   Neurological:      Mental Status: He is alert.   Psychiatric:         Mood and Affect: Mood normal.       Results  No results found for: \"PSA\"  No results found for: \"GLUCOSE\", \"CALCIUM\", \"NA\", \"K\", \"CO2\", \"CL\", \"BUN\", \"CREATININE\"  No results found for: \"WBC\", \"HGB\", \"HCT\", \"MCV\", \"PLT\"    Office Urine Dip  Recent Results (from the past 1 hour(s))   POCT urine dip    Collection Time: 08/05/24  9:41 AM   Result Value Ref Range    LEUKOCYTE ESTERASE,UA neg     NITRITE,UA neg     SL AMB POCT UROBILINOGEN 0.2     POCT URINE PROTEIN neg      PH,UA 5.0     BLOOD,UA neg     SPECIFIC GRAVITY,UA 1.015     KETONES,UA neg     BILIRUBIN,UA neg     GLUCOSE, UA neg      COLOR,UA yellow     CLARITY,UA clear    POCT Measure PVR    Collection Time: 08/05/24  9:43 AM   Result Value Ref Range    POST-VOID RESIDUAL VOLUME, ML POC 0 mL   ]        "

## 2024-08-05 ENCOUNTER — OFFICE VISIT (OUTPATIENT)
Dept: UROLOGY | Facility: HOSPITAL | Age: 74
End: 2024-08-05
Payer: COMMERCIAL

## 2024-08-05 VITALS
SYSTOLIC BLOOD PRESSURE: 136 MMHG | HEART RATE: 76 BPM | OXYGEN SATURATION: 97 % | HEIGHT: 70 IN | WEIGHT: 216.8 LBS | DIASTOLIC BLOOD PRESSURE: 84 MMHG | BODY MASS INDEX: 31.04 KG/M2

## 2024-08-05 DIAGNOSIS — R35.0 FREQUENCY OF URINATION: ICD-10-CM

## 2024-08-05 DIAGNOSIS — Z12.5 SCREENING FOR PROSTATE CANCER: ICD-10-CM

## 2024-08-05 DIAGNOSIS — N52.8 OTHER MALE ERECTILE DYSFUNCTION: Primary | ICD-10-CM

## 2024-08-05 DIAGNOSIS — N39.46 MIXED INCONTINENCE: ICD-10-CM

## 2024-08-05 LAB
POST-VOID RESIDUAL VOLUME, ML POC: 0 ML
SL AMB  POCT GLUCOSE, UA: NORMAL
SL AMB LEUKOCYTE ESTERASE,UA: NORMAL
SL AMB POCT BILIRUBIN,UA: NORMAL
SL AMB POCT BLOOD,UA: NORMAL
SL AMB POCT CLARITY,UA: CLEAR
SL AMB POCT COLOR,UA: YELLOW
SL AMB POCT KETONES,UA: NORMAL
SL AMB POCT NITRITE,UA: NORMAL
SL AMB POCT PH,UA: 5
SL AMB POCT SPECIFIC GRAVITY,UA: 1.01
SL AMB POCT URINE PROTEIN: NORMAL
SL AMB POCT UROBILINOGEN: 0.2

## 2024-08-05 PROCEDURE — 81002 URINALYSIS NONAUTO W/O SCOPE: CPT

## 2024-08-05 PROCEDURE — 51798 US URINE CAPACITY MEASURE: CPT

## 2024-08-05 PROCEDURE — 99214 OFFICE O/P EST MOD 30 MIN: CPT

## 2024-08-05 RX ORDER — TADALAFIL 20 MG/1
20 TABLET ORAL DAILY PRN
Qty: 10 TABLET | Refills: 0 | Status: SHIPPED | OUTPATIENT
Start: 2024-08-05

## 2024-08-05 RX ORDER — OXYBUTYNIN CHLORIDE 10 MG/1
10 TABLET, EXTENDED RELEASE ORAL
Qty: 30 TABLET | Refills: 0 | Status: SHIPPED | OUTPATIENT
Start: 2024-08-05

## 2024-08-05 NOTE — ASSESSMENT & PLAN NOTE
Previously taking sildenafil 100 mg PRN  Not content with results  Trial cialis 20 mg PRN to see if it offers better results  Side effects discussed   Trimix would be next step - discussed with patient, not interested at this time   Continue to monitor

## 2024-08-05 NOTE — ASSESSMENT & PLAN NOTE
Cystoscopy 3/9/22 -- enlarged lateral lobes causing obstruction  AUA score 22 today in office   PVR 0 mL  Urine dip shows no sign of infection or blood today in office    Discontinue Flomax 0.4 mg BID as prescribed - symptoms have not changed while on medication   Continue finasteride 5 mg daily as prescribed   Refill as needed   Trial oxybutynin 10 mg daily for urinary frequency   Reach out to office for refills or to try a different medication   Encouraged increased hydration and avoidance of bladder irritants / constipation   If symptoms persist consider repeat cysto/TURS with MD and surgical options discussion

## 2024-08-05 NOTE — ASSESSMENT & PLAN NOTE
No prior PSA on file - order placed  TARUN deferred - recommend at next appointment   Denies family history of prostate cancer

## 2024-09-04 PROBLEM — Z12.5 SCREENING FOR PROSTATE CANCER: Status: RESOLVED | Noted: 2024-08-05 | Resolved: 2024-09-04

## 2025-02-11 NOTE — PROGRESS NOTES
Name: Jonathan Kimbrough      : 1950      MRN: [id number]  Encounter Provider: Natalya Aguila PA-C  Encounter Date: 2025   Encounter department: [facility]  :  Assessment & Plan  Frequency of urination  AUA score is 5 today in office   Continue taking finasteride 5 mg daily and flomax 0.8 mg daily   Continue taking oxybutynin 10 mg daily as prescribed   Content with medication results  Refill as needed  PVR 25 ml today in office - no retention    If symptoms persist consider repeat cysto/TURS with MD and surgical options discussion    Orders:    POCT Measure PVR    tamsulosin (FLOMAX) 0.4 mg; Take 2 capsules (0.8 mg total) by mouth in the morning    oxybutynin (DITROPAN-XL) 10 MG 24 hr tablet; Take 1 tablet (10 mg total) by mouth daily at bedtime    finasteride (PROSCAR) 5 mg tablet; Take 1 tablet (5 mg total) by mouth daily    Other male erectile dysfunction  Continue taking cialis 20 mg PRN as prescribed   Minimal efficacy, but will continue the medication   Refill as needed  Previously discussed trimix as next steps, remains uninterested     Orders:    tadalafil (CIALIS) 20 MG tablet; Take 1 tablet (20 mg total) by mouth daily as needed for erectile dysfunction    Screening for prostate cancer  No history of elevated PSA   Repeat order placed - follow up annually   Orders:    PSA, Total Screen; Future      History of Present Illness   Jonathan Kimbrough is a 74 y.o. male who presents to the office with his wife to rediscuss his voiding pattern and erectile dysfunction.  Patient is currently on finasteride 5 mg daily, Flomax 0.8 mg daily as well as oxybutynin 10 mg daily.  AUA score is 5 today in office.  He has noticed drastic improvement in his overall urinary pattern since beginning [place] twice daily.  He denies any side effects of the medication.  PVR is 25 mL today in office, no signs of urinary retention.  The patient is aware that if his symptoms would  worsen, neck step would be cystoscopy/TRUS with MD to discuss surgical options.  Patient would like to avoid surgery unless absolutely necessary.  When comes the patient's erectile dysfunction he is currently taking Cialis 20 mg as needed, 1 hour prior to sexual activity.  He reports minimal efficacy overall, but is not interested in moving forward with Trimix injections.  When it comes to his annual PSA screening, he is due for an updated level.  No history of elevated PSA in the past.  Repeat PSA order placed in patient's chart.  He will plan to follow-up with us in 1 year for ongoing monitoring.    AUA SYMPTOM SCORE      Flowsheet Row Most Recent Value   AUA SYMPTOM SCORE    How often have you had a sensation of not emptying your bladder completely after you finished urinating? 1 (P)     How often have you had to urinate again less than two hours after you finished urinating? 2 (P)     How often have you found you stopped and started again several times when you urinate? 0 (P)     How often have you found it difficult to postpone urination? 1 (P)     How often have you had a weak urinary stream? 0 (P)     How often have you had to push or strain to begin urination? 0 (P)     How many times did you most typically get up to urinate from the time you went to bed at night until the time you got up in the morning? 1 (P)     Quality of Life: If you were to spend the rest of your life with your urinary condition just the way it is now, how would you feel about that? 3 (P)     AUA SYMPTOM SCORE 5 (P)            Review of Systems   Constitutional:  Negative for activity change, fatigue and fever.   Respiratory:  Negative for apnea, cough and shortness of breath.    Cardiovascular:  Negative for chest pain and leg swelling.   Gastrointestinal:  Negative for abdominal distention, abdominal pain, constipation, diarrhea, nausea and vomiting.   Genitourinary:  Positive for frequency. Negative for decreased urine volume,  difficulty urinating, dysuria, flank pain, hematuria, penile pain, testicular pain and urgency.   Neurological:  Negative for dizziness and headaches.   Psychiatric/Behavioral: Negative.       Medical History Reviewed by provider this encounter:  Tobacco  Allergies  Meds  Problems  Med Hx  Surg Hx  Fam Hx     .  Current Outpatient Medications on File Prior to Visit   Medication Sig Dispense Refill    CALCIUM-MAGNESIUM PO Take by mouth      Colchicine 0.6 MG CAPS TAKE 2 TABLETS BY MOUTH AT EARLIEST ONSET OF ATTACK OF GOUT      ibuprofen (MOTRIN) 800 mg tablet       Omega-3 Fatty Acids (fish oil) 1,000 mg Take 1,000 mg by mouth 4 (four) times a day (before meals and at bedtime)      Probiotic Product (PROBIOTIC PO) Take by mouth      [DISCONTINUED] finasteride (PROSCAR) 5 mg tablet Take 1 tablet (5 mg total) by mouth daily 90 tablet 1    [DISCONTINUED] oxybutynin (DITROPAN-XL) 10 MG 24 hr tablet Take 1 tablet (10 mg total) by mouth daily at bedtime 30 tablet 0    [DISCONTINUED] tadalafil (CIALIS) 20 MG tablet Take 1 tablet (20 mg total) by mouth daily as needed for erectile dysfunction 10 tablet 0    [DISCONTINUED] tamsulosin (FLOMAX) 0.4 mg Take 2 capsules (0.8 mg total) by mouth daily with dinner 180 capsule 3    [DISCONTINUED] meloxicam (MOBIC) 15 mg tablet Take 7.5-15 mg by mouth daily       No current facility-administered medications on file prior to visit.      Social History     Tobacco Use    Smoking status: Never    Smokeless tobacco: Never   Vaping Use    Vaping status: Never Used   Substance and Sexual Activity    Alcohol use: Not Currently    Drug use: Never    Sexual activity: Not on file        Objective   There were no vitals taken for this visit.    Physical Exam  Vitals and nursing note reviewed.   Constitutional:       General: He is not in acute distress.     Appearance: Normal appearance. He is well-developed. He is not ill-appearing.   HENT:      Head: Normocephalic and atraumatic.   Eyes:  "     Conjunctiva/sclera: Conjunctivae normal.   Cardiovascular:      Rate and Rhythm: Normal rate and regular rhythm.      Heart sounds: No murmur heard.  Pulmonary:      Effort: Pulmonary effort is normal. No respiratory distress.      Breath sounds: Normal breath sounds.   Abdominal:      General: Abdomen is flat. There is no distension.      Palpations: Abdomen is soft.      Tenderness: There is no abdominal tenderness. There is no right CVA tenderness or left CVA tenderness.   Musculoskeletal:         General: No swelling.      Cervical back: Neck supple.   Skin:     General: Skin is warm and dry.      Capillary Refill: Capillary refill takes less than 2 seconds.   Neurological:      Mental Status: He is alert.   Psychiatric:         Mood and Affect: Mood normal.        Results  No results found for: \"PSA\"  No results found for: \"GLUCOSE\", \"CALCIUM\", \"NA\", \"K\", \"CO2\", \"CL\", \"BUN\", \"CREATININE\"  No results found for: \"WBC\", \"HGB\", \"HCT\", \"MCV\", \"PLT\"    Office Urine Dip  No results found for this or any previous visit (from the past hour).]      "

## 2025-02-12 ENCOUNTER — OFFICE VISIT (OUTPATIENT)
Dept: UROLOGY | Facility: HOSPITAL | Age: 75
End: 2025-02-12
Payer: MEDICARE

## 2025-02-12 VITALS
HEIGHT: 70 IN | OXYGEN SATURATION: 96 % | DIASTOLIC BLOOD PRESSURE: 84 MMHG | WEIGHT: 222.2 LBS | BODY MASS INDEX: 31.81 KG/M2 | SYSTOLIC BLOOD PRESSURE: 136 MMHG | HEART RATE: 65 BPM

## 2025-02-12 DIAGNOSIS — R35.0 FREQUENCY OF URINATION: Primary | ICD-10-CM

## 2025-02-12 DIAGNOSIS — N52.8 OTHER MALE ERECTILE DYSFUNCTION: ICD-10-CM

## 2025-02-12 DIAGNOSIS — Z12.5 SCREENING FOR PROSTATE CANCER: ICD-10-CM

## 2025-02-12 LAB — POST-VOID RESIDUAL VOLUME, ML POC: 25 ML

## 2025-02-12 PROCEDURE — 99214 OFFICE O/P EST MOD 30 MIN: CPT

## 2025-02-12 PROCEDURE — 51798 US URINE CAPACITY MEASURE: CPT

## 2025-02-12 RX ORDER — TAMSULOSIN HYDROCHLORIDE 0.4 MG/1
0.8 CAPSULE ORAL DAILY
Qty: 180 CAPSULE | Refills: 3 | Status: SHIPPED | OUTPATIENT
Start: 2025-02-12

## 2025-02-12 RX ORDER — OXYBUTYNIN CHLORIDE 10 MG/1
10 TABLET, EXTENDED RELEASE ORAL
Qty: 30 TABLET | Refills: 3 | Status: SHIPPED | OUTPATIENT
Start: 2025-02-12

## 2025-02-12 RX ORDER — TADALAFIL 20 MG/1
20 TABLET ORAL DAILY PRN
Qty: 10 TABLET | Refills: 0 | Status: SHIPPED | OUTPATIENT
Start: 2025-02-12

## 2025-02-12 RX ORDER — FINASTERIDE 5 MG/1
5 TABLET, FILM COATED ORAL DAILY
Qty: 90 TABLET | Refills: 1 | Status: SHIPPED | OUTPATIENT
Start: 2025-02-12

## 2025-02-12 NOTE — PROGRESS NOTES
Name: Jonathan Kimbrough      : 1950      MRN: [id number]  Encounter Provider: Natalya Aguila PA-C  Encounter Date: 2025   Encounter department: [facility]  :  Assessment & Plan  Frequency of urination  AUA score is 5 today in office   Continue taking finasteride 5 mg daily and flomax 0.8 mg daily   Continue taking oxybutynin 10 mg daily as prescribed   Content with medication results  Refill as needed  PVR 25 ml today in office - no retention    If symptoms persist consider repeat cysto/TURS with MD and surgical options discussion    Orders:    POCT Measure PVR    tamsulosin (FLOMAX) 0.4 mg; Take 2 capsules (0.8 mg total) by mouth in the morning    oxybutynin (DITROPAN-XL) 10 MG 24 hr tablet; Take 1 tablet (10 mg total) by mouth daily at bedtime    finasteride (PROSCAR) 5 mg tablet; Take 1 tablet (5 mg total) by mouth daily    Other male erectile dysfunction  Continue taking cialis 20 mg PRN as prescribed   Minimal efficacy, but will continue the medication   Refill as needed  Previously discussed trimix as next steps, remains uninterested      Orders:    tadalafil (CIALIS) 20 MG tablet; Take 1 tablet (20 mg total) by mouth daily as needed for erectile dysfunction    Screening for prostate cancer  No history of elevated PSA   Repeat order placed - follow up annually     Orders:    PSA, Total Screen; Future        History of Present Illness   Jonathan Kimbrough is a 74 y.o. male who presents to the office with his wife to rediscuss his voiding pattern and erectile dysfunction.  Patient is currently on finasteride 5 mg daily, Flomax 0.8 mg daily as well as oxybutynin 10 mg daily.  AUA score is 5 today in office.  He has noticed drastic improvement in his overall urinary pattern since beginning [place] twice daily.  He denies any side effects of the medication.  PVR is 25 mL today in office, no signs of urinary retention.  The patient is aware that if his symptoms would  worsen, neck step would be cystoscopy/TRUS with MD to discuss surgical options.  Patient would like to avoid surgery unless absolutely necessary.  When comes the patient's erectile dysfunction he is currently taking Cialis 20 mg as needed, 1 hour prior to sexual activity.  He reports minimal efficacy overall, but is not interested in moving forward with Trimix injections.  When it comes to his annual PSA screening, he is due for an updated level.  No history of elevated PSA in the past.  Repeat PSA order placed in patient's chart.  He will plan to follow-up with us in 1 year for ongoing monitoring.    AUA SYMPTOM SCORE      Flowsheet Row Most Recent Value   AUA SYMPTOM SCORE    How often have you had a sensation of not emptying your bladder completely after you finished urinating? 1 (P)     How often have you had to urinate again less than two hours after you finished urinating? 2 (P)     How often have you found you stopped and started again several times when you urinate? 0 (P)     How often have you found it difficult to postpone urination? 1 (P)     How often have you had a weak urinary stream? 0 (P)     How often have you had to push or strain to begin urination? 0 (P)     How many times did you most typically get up to urinate from the time you went to bed at night until the time you got up in the morning? 1 (P)     Quality of Life: If you were to spend the rest of your life with your urinary condition just the way it is now, how would you feel about that? 3 (P)     AUA SYMPTOM SCORE 5 (P)            Review of Systems   Constitutional:  Negative for activity change, chills, fatigue and fever.   Respiratory:  Negative for apnea, cough and shortness of breath.    Cardiovascular:  Negative for chest pain and leg swelling.   Gastrointestinal:  Negative for abdominal distention, abdominal pain, constipation, diarrhea, nausea and vomiting.   Genitourinary:  Positive for frequency. Negative for decreased urine  "volume, difficulty urinating, dysuria, flank pain, hematuria, penile pain, testicular pain and urgency.   Neurological:  Negative for dizziness and headaches.   Psychiatric/Behavioral: Negative.       Medical History Reviewed by provider this encounter:  Tobacco  Allergies  Meds  Problems  Med Hx  Surg Hx  Fam Hx     .  Current Outpatient Medications on File Prior to Visit   Medication Sig Dispense Refill    CALCIUM-MAGNESIUM PO Take by mouth      Colchicine 0.6 MG CAPS TAKE 2 TABLETS BY MOUTH AT EARLIEST ONSET OF ATTACK OF GOUT      ibuprofen (MOTRIN) 800 mg tablet       Omega-3 Fatty Acids (fish oil) 1,000 mg Take 1,000 mg by mouth 4 (four) times a day (before meals and at bedtime)      Probiotic Product (PROBIOTIC PO) Take by mouth      [DISCONTINUED] finasteride (PROSCAR) 5 mg tablet Take 1 tablet (5 mg total) by mouth daily 90 tablet 1    [DISCONTINUED] oxybutynin (DITROPAN-XL) 10 MG 24 hr tablet Take 1 tablet (10 mg total) by mouth daily at bedtime 30 tablet 0    [DISCONTINUED] tadalafil (CIALIS) 20 MG tablet Take 1 tablet (20 mg total) by mouth daily as needed for erectile dysfunction 10 tablet 0    [DISCONTINUED] tamsulosin (FLOMAX) 0.4 mg Take 2 capsules (0.8 mg total) by mouth daily with dinner 180 capsule 3    [DISCONTINUED] meloxicam (MOBIC) 15 mg tablet Take 7.5-15 mg by mouth daily       No current facility-administered medications on file prior to visit.      Social History     Tobacco Use    Smoking status: Never    Smokeless tobacco: Never   Vaping Use    Vaping status: Never Used   Substance and Sexual Activity    Alcohol use: Not Currently    Drug use: Never    Sexual activity: Not on file        Objective   /84 (BP Location: Left arm, Patient Position: Sitting, Cuff Size: Adult)   Pulse 65   Ht 5' 10\" (1.778 m)   Wt 101 kg (222 lb 3.2 oz)   SpO2 96%   BMI 31.88 kg/m²     Physical Exam  Vitals and nursing note reviewed.   Constitutional:       General: He is not in acute " "distress.     Appearance: Normal appearance. He is well-developed. He is not ill-appearing.   HENT:      Head: Normocephalic and atraumatic.   Eyes:      Conjunctiva/sclera: Conjunctivae normal.   Cardiovascular:      Rate and Rhythm: Normal rate and regular rhythm.      Heart sounds: No murmur heard.  Pulmonary:      Effort: Pulmonary effort is normal. No respiratory distress.      Breath sounds: Normal breath sounds.   Abdominal:      General: Abdomen is flat. There is no distension.      Palpations: Abdomen is soft.      Tenderness: There is no abdominal tenderness. There is no right CVA tenderness or left CVA tenderness.   Musculoskeletal:         General: No swelling.      Cervical back: Neck supple.   Skin:     General: Skin is warm and dry.      Capillary Refill: Capillary refill takes less than 2 seconds.   Neurological:      Mental Status: He is alert.   Psychiatric:         Mood and Affect: Mood normal.          Results  No results found for: \"PSA\"  No results found for: \"GLUCOSE\", \"CALCIUM\", \"NA\", \"K\", \"CO2\", \"CL\", \"BUN\", \"CREATININE\"  No results found for: \"WBC\", \"HGB\", \"HCT\", \"MCV\", \"PLT\"    Office Urine Dip  No results found for this or any previous visit (from the past hour).]      "

## 2025-02-12 NOTE — ASSESSMENT & PLAN NOTE
AUA score is 5 today in office   Continue taking finasteride 5 mg daily and flomax 0.8 mg daily   Continue taking oxybutynin 10 mg daily as prescribed   Content with medication results  Refill as needed  PVR 25 ml today in office - no retention    If symptoms persist consider repeat cysto/TURS with MD and surgical options discussion    Orders:    POCT Measure PVR    tamsulosin (FLOMAX) 0.4 mg; Take 2 capsules (0.8 mg total) by mouth in the morning    oxybutynin (DITROPAN-XL) 10 MG 24 hr tablet; Take 1 tablet (10 mg total) by mouth daily at bedtime    finasteride (PROSCAR) 5 mg tablet; Take 1 tablet (5 mg total) by mouth daily

## 2025-02-12 NOTE — ASSESSMENT & PLAN NOTE
AUA score is 5 today in office   Continue taking finasteride 5 mg daily and flomax 0.8 mg daily   Continue taking oxybutynin 10 mg daily as prescribed   Content with medication results  Refill as needed  PVR 25 ml today in office - no retention    If symptoms persist consider repeat cysto/TURS with MD and surgical options discussion    Orders:    POCT Measure PVR    tamsulosin (FLOMAX) 0.4 mg; Take 2 capsules (0.8 mg total) by mouth in the morning    oxybutynin (DITROPAN-XL) 10 MG 24 hr tablet; Take 1 tablet (10 mg total) by mouth daily at bedtime    finasteride (PROSCAR) 5 mg tablet; Take 1 tablet (5 mg total) by mouth daily

## 2025-02-12 NOTE — ASSESSMENT & PLAN NOTE
Continue taking cialis 20 mg PRN as prescribed   Minimal efficacy, but will continue the medication   Refill as needed  Previously discussed trimix as next steps, remains uninterested     Orders:    tadalafil (CIALIS) 20 MG tablet; Take 1 tablet (20 mg total) by mouth daily as needed for erectile dysfunction

## 2025-03-06 LAB — PSA SERPL-MCNC: 0.1 NG/ML (ref 0–4)

## 2025-03-18 DIAGNOSIS — R35.0 FREQUENCY OF URINATION: ICD-10-CM

## 2025-03-18 RX ORDER — TAMSULOSIN HYDROCHLORIDE 0.4 MG/1
0.8 CAPSULE ORAL
Qty: 180 CAPSULE | Refills: 3 | Status: SHIPPED | OUTPATIENT
Start: 2025-03-18